# Patient Record
Sex: FEMALE | Race: WHITE | NOT HISPANIC OR LATINO | URBAN - METROPOLITAN AREA
[De-identification: names, ages, dates, MRNs, and addresses within clinical notes are randomized per-mention and may not be internally consistent; named-entity substitution may affect disease eponyms.]

---

## 2023-03-08 LAB — URINE CULTURE: NORMAL

## 2023-03-09 PROBLEM — F41.9 ANXIETY: Status: ACTIVE | Noted: 2023-03-09

## 2023-03-09 PROBLEM — R10.12 ABDOMINAL PAIN, LUQ (LEFT UPPER QUADRANT): Status: ACTIVE | Noted: 2023-03-09

## 2023-03-09 PROBLEM — F41.0 PANIC ATTACK: Status: ACTIVE | Noted: 2023-03-09

## 2023-03-09 PROBLEM — R00.2 PALPITATIONS: Status: ACTIVE | Noted: 2023-03-09

## 2023-03-09 PROBLEM — E05.90 HYPERTHYROIDISM: Status: ACTIVE | Noted: 2023-03-09

## 2023-03-09 PROBLEM — R19.4 CHANGE IN BOWEL HABITS: Status: ACTIVE | Noted: 2023-03-09

## 2023-03-09 PROBLEM — K58.9 IRRITABLE BOWEL DISEASE: Status: ACTIVE | Noted: 2023-03-09

## 2023-03-09 RX ORDER — MULTIVITAMIN
TABLET ORAL
COMMUNITY

## 2023-03-09 RX ORDER — BUSPIRONE HYDROCHLORIDE 7.5 MG/1
1 TABLET ORAL 2 TIMES DAILY
COMMUNITY
Start: 2022-05-05 | End: 2024-01-04 | Stop reason: ALTCHOICE

## 2023-03-09 RX ORDER — CITALOPRAM 20 MG/1
20 TABLET, FILM COATED ORAL DAILY
COMMUNITY
End: 2023-07-06

## 2023-03-10 ENCOUNTER — APPOINTMENT (OUTPATIENT)
Dept: PRIMARY CARE | Facility: CLINIC | Age: 38
End: 2023-03-10

## 2023-07-06 DIAGNOSIS — F41.9 ANXIETY: ICD-10-CM

## 2023-07-06 DIAGNOSIS — F41.9 ANXIETY: Primary | ICD-10-CM

## 2023-07-06 DIAGNOSIS — F41.0 PANIC ATTACKS: ICD-10-CM

## 2023-07-06 RX ORDER — CITALOPRAM 20 MG/1
TABLET, FILM COATED ORAL
Qty: 30 TABLET | Refills: 0 | Status: SHIPPED | OUTPATIENT
Start: 2023-07-06 | End: 2024-01-04 | Stop reason: SDUPTHER

## 2023-07-06 RX ORDER — CITALOPRAM 20 MG/1
20 TABLET, FILM COATED ORAL DAILY
Status: CANCELLED | OUTPATIENT
Start: 2023-07-06

## 2024-01-04 ENCOUNTER — TELEMEDICINE (OUTPATIENT)
Dept: PRIMARY CARE | Facility: CLINIC | Age: 39
End: 2024-01-04
Payer: COMMERCIAL

## 2024-01-04 DIAGNOSIS — F41.0 PANIC ATTACKS: ICD-10-CM

## 2024-01-04 DIAGNOSIS — F41.9 ANXIETY: ICD-10-CM

## 2024-01-04 PROCEDURE — 99214 OFFICE O/P EST MOD 30 MIN: CPT | Performed by: STUDENT IN AN ORGANIZED HEALTH CARE EDUCATION/TRAINING PROGRAM

## 2024-01-04 RX ORDER — HYDROXYZINE HYDROCHLORIDE 10 MG/1
10 TABLET, FILM COATED ORAL 3 TIMES DAILY
Qty: 30 TABLET | Refills: 0 | Status: SHIPPED | OUTPATIENT
Start: 2024-01-04 | End: 2024-02-03

## 2024-01-04 RX ORDER — LOTEPREDNOL ETABONATE 5 MG/ML
SUSPENSION/ DROPS OPHTHALMIC
COMMUNITY
Start: 2023-11-29

## 2024-01-04 RX ORDER — CITALOPRAM 40 MG/1
40 TABLET, FILM COATED ORAL DAILY
Qty: 90 TABLET | Refills: 1 | Status: SHIPPED | OUTPATIENT
Start: 2024-01-04

## 2024-01-04 NOTE — PROGRESS NOTES
Subjective   Patient ID: Minoo Jaffe is a 38 y.o. female who presents for Anxiety.    HPI   Minoo is presenting to the office today via a telemedicine virtual visit to discuss ongoing anxiety and panic attacks    Unfortunately she stated that her house is going through [employer] as well as her  recently moved out    Notices that he has heightened anxiety    She is currently taking Celexa/citalopram 20 mg daily and notices that she is not fully well-controlled    Wondering if she can have some other medication to help for panic attacks even possible a benzodiazepine or something similar    Asking for advice/recommendations  Denies any HI/SI    Review of Systems  All pertinent positive symptoms are included in the history of present illness.    All other systems have been reviewed and are negative and noncontributory to this patient's current ailments.    Objective   There were no vitals taken for this visit.    Physical Exam  CONSTITUTIONAL - well nourished, well developed, looks like stated age, in no acute distress, not ill-appearing, and not tired appearing  SKIN - normal skin color and pigmentation, normal skin turgor without rash, lesions, or nodules visualized  HEAD - no trauma, normocephalic  EYES - normal external exam  CHEST -no distressed breathing, good effort  EXTREMITIES - no edema, no deformities  NEUROLOGICAL - normal balance, normal motor, no ataxia  PSYCHIATRIC - alert, pleasant and cordial, age-appropriate    Assessment/Plan   We had a conversation about all of your signs/symptoms and unfortunately do not prescribe benzodiazepine on the regular as well as I am very limited on what I can do on a virtual visit    In its place I will place you on hydroxyzine 10 mg to be taken up to 10 times daily    You may even take up to 30 mg at a time  Please take this on an as needed basis    Also I did increase her Celexa to 40 mg daily  This was sent to your local pharmacy    Please follow-up  within the next 1-2 months for close follow-up as well as to discuss health maintenance topics/needs

## 2024-01-08 ENCOUNTER — OFFICE VISIT (OUTPATIENT)
Dept: PRIMARY CARE | Facility: CLINIC | Age: 39
End: 2024-01-08
Payer: COMMERCIAL

## 2024-01-08 VITALS
WEIGHT: 124.6 LBS | HEART RATE: 115 BPM | SYSTOLIC BLOOD PRESSURE: 140 MMHG | BODY MASS INDEX: 22.08 KG/M2 | HEIGHT: 63 IN | DIASTOLIC BLOOD PRESSURE: 84 MMHG

## 2024-01-08 DIAGNOSIS — F41.0 PANIC ATTACKS: ICD-10-CM

## 2024-01-08 DIAGNOSIS — Z00.00 HEALTHCARE MAINTENANCE: ICD-10-CM

## 2024-01-08 DIAGNOSIS — F41.9 ANXIETY: ICD-10-CM

## 2024-01-08 DIAGNOSIS — B37.0 ORAL THRUSH: Primary | ICD-10-CM

## 2024-01-08 PROCEDURE — 99214 OFFICE O/P EST MOD 30 MIN: CPT | Performed by: STUDENT IN AN ORGANIZED HEALTH CARE EDUCATION/TRAINING PROGRAM

## 2024-01-08 PROCEDURE — 1036F TOBACCO NON-USER: CPT | Performed by: STUDENT IN AN ORGANIZED HEALTH CARE EDUCATION/TRAINING PROGRAM

## 2024-01-08 RX ORDER — ALPRAZOLAM 0.25 MG/1
0.25 TABLET ORAL 3 TIMES DAILY PRN
Qty: 10 TABLET | Refills: 0 | Status: SHIPPED | OUTPATIENT
Start: 2024-01-08

## 2024-01-08 RX ORDER — CLOTRIMAZOLE 10 MG/1
10 LOZENGE ORAL; TOPICAL
Qty: 50 TABLET | Refills: 0 | Status: SHIPPED | OUTPATIENT
Start: 2024-01-08 | End: 2024-01-18

## 2024-01-08 NOTE — PROGRESS NOTES
Subjective   Patient ID: Minoo Jaffe is a 38 y.o. female who presents for possible thrush.  Today she is accompanied by alone.     HPI  Oral thrush  Began a few days ago  This has not happened to her before  Reports extremely dry mouth   States she notes a white residue and some tongue irritation/hardness on the lateral portions  Wishes to discuss this further    Depression/anxiety   recently left her and house is foreclosure  Recently under overwhelming amount of stress  Gerda with smoking 1 ppd and alcohol  At her last visit it was discussed that we increase her Celexa to 40 mg daily as well as provided her hydroxyzine  Stated that the hydroxyzine just makes her sleepy and does not help  In the past she has used Xanax on an as-needed basis and now wondering if this can be done acutely because she is frustrated that she is not smoking and drinking    Health care maintenance  Due for CBC, CMP, lipid panel, hemoglobin A1c, vitamin B12, vitamin D, and TSH    Current Outpatient Medications on File Prior to Visit   Medication Sig Dispense Refill    citalopram (CeleXA) 40 mg tablet Take 1 tablet (40 mg) by mouth once daily. as directed 90 tablet 1    hydrOXYzine HCL (Atarax) 10 mg tablet Take 1 tablet (10 mg) by mouth 3 times a day. 30 tablet 0    loteprednol (Lotemax) 0.5 % ophthalmic suspension INSTILL 1 DROP IN BOTH EYES TWICE DAILY      multivitamin (Multiple Vitamins) tablet Multiple Vitamin TABS   Refills: 0       Active      [DISCONTINUED] busPIRone (Buspar) 7.5 mg tablet Take 1 tablet (7.5 mg) by mouth in the morning and 1 tablet (7.5 mg) before bedtime.      [DISCONTINUED] citalopram (CeleXA) 20 mg tablet TAKE 1 TABLET BY MOUTH EVERY DAY AS DIRECTED 30 tablet 0     No current facility-administered medications on file prior to visit.        No Known Allergies    Immunization History   Administered Date(s) Administered    Tdap vaccine, age 7 year and older (BOOSTRIX) 03/25/2015         Review of  "Systems  All pertinent positive symptoms are included in the history of present illness.  All other systems have been reviewed and are negative and noncontributory to this patient's current ailments.     Objective   /84 (BP Location: Left arm, Patient Position: Sitting, BP Cuff Size: Adult)   Pulse (!) 115   Ht 1.6 m (5' 3\")   Wt 56.5 kg (124 lb 9.6 oz)   BMI 22.07 kg/m²   BSA: 1.58 meters squared  No visits with results within 1 Month(s) from this visit.   Latest known visit with results is:   Orders Only on 03/08/2023   Component Date Value Ref Range Status    Urine Culture 03/07/2023 NO SIGNIFICANT GROWTH.   Final       Physical Exam  CONSTITUTIONAL - well nourished, well developed, looks like stated age, in no acute distress, not ill-appearing, and not tired appearing  SKIN - normal skin color and pigmentation, normal skin turgor without rash, lesions, or nodules visualized  HEAD - no trauma, normocephalic  EYES - normal external exam  ENT - TM's intact, no injection. White base on tongue, scrapable  NECK - supple without rigidity, no neck mass was observed  CHEST - clear to auscultation, no wheezing, no crackles and no rales, good effort  CARDIAC - regular rate and regular rhythm, no skipped beats, no murmur  EXTREMITIES - no edema, no deformities  NEUROLOGICAL - normal gait, normal balance, normal motor, no ataxia  PSYCHIATRIC - alert, pleasant and cordial, age-appropriate  IMMUNOLOGIC - no cervical lymphadenopathy     Assessment/Plan   Oral thrush  We discussed that current alcohol and smoking combination could be contributing to the thrush  Begin Clotrimazole 10 mg lozenges by mouth 5 times a day for 10 days  If this continues to be an issue need to let us know within the next 7-10 days    Depression/anxiety  Continue taking Celexa 40 mg tablet by mouth once daily  Continue hydroxyzine 10 mg tablet by mouth 3 times daily  Begin Xanax 0.25 mg tablet 3 times a day as needed acutely due to your " signs/symptoms  I recommend that if this continues to be an issue we will have to discuss to change off the Celexa as I do not want Xanax to be continued on a long-term basis  If this continues to be uncontrolled, the neck step would be following with a psychiatrist  I also added fasting lab work as noted below to see if there is anything organic causing this    Health care maintenance  I ordered CBC, CMP, lipid panel, hemoglobin A1c, vitamin B12, vitamin D, and TSH  Please complete at your earliest convenience

## 2024-01-09 ENCOUNTER — LAB (OUTPATIENT)
Dept: LAB | Facility: LAB | Age: 39
End: 2024-01-09
Payer: COMMERCIAL

## 2024-01-09 DIAGNOSIS — Z00.00 HEALTHCARE MAINTENANCE: ICD-10-CM

## 2024-01-09 DIAGNOSIS — F41.9 ANXIETY: ICD-10-CM

## 2024-01-09 DIAGNOSIS — F41.0 PANIC ATTACKS: ICD-10-CM

## 2024-01-09 LAB
25(OH)D3 SERPL-MCNC: 28 NG/ML (ref 30–100)
ALBUMIN SERPL BCP-MCNC: 4.5 G/DL (ref 3.4–5)
ALP SERPL-CCNC: 45 U/L (ref 33–110)
ALT SERPL W P-5'-P-CCNC: 51 U/L (ref 7–45)
ANION GAP SERPL CALC-SCNC: 14 MMOL/L (ref 10–20)
AST SERPL W P-5'-P-CCNC: 41 U/L (ref 9–39)
BASOPHILS # BLD AUTO: 0.03 X10*3/UL (ref 0–0.1)
BASOPHILS NFR BLD AUTO: 0.4 %
BILIRUB SERPL-MCNC: 0.9 MG/DL (ref 0–1.2)
BUN SERPL-MCNC: 9 MG/DL (ref 6–23)
CALCIUM SERPL-MCNC: 9.6 MG/DL (ref 8.6–10.3)
CHLORIDE SERPL-SCNC: 99 MMOL/L (ref 98–107)
CHOLEST SERPL-MCNC: 156 MG/DL (ref 0–199)
CHOLESTEROL/HDL RATIO: 2.1
CO2 SERPL-SCNC: 25 MMOL/L (ref 21–32)
CREAT SERPL-MCNC: 0.57 MG/DL (ref 0.5–1.05)
EGFRCR SERPLBLD CKD-EPI 2021: >90 ML/MIN/1.73M*2
EOSINOPHIL # BLD AUTO: 0.09 X10*3/UL (ref 0–0.7)
EOSINOPHIL NFR BLD AUTO: 1.2 %
ERYTHROCYTE [DISTWIDTH] IN BLOOD BY AUTOMATED COUNT: 12.8 % (ref 11.5–14.5)
EST. AVERAGE GLUCOSE BLD GHB EST-MCNC: 105 MG/DL
GLUCOSE SERPL-MCNC: 105 MG/DL (ref 74–99)
HBA1C MFR BLD: 5.3 %
HCT VFR BLD AUTO: 43.2 % (ref 36–46)
HDLC SERPL-MCNC: 75 MG/DL
HGB BLD-MCNC: 14.5 G/DL (ref 12–16)
IMM GRANULOCYTES # BLD AUTO: 0.02 X10*3/UL (ref 0–0.7)
IMM GRANULOCYTES NFR BLD AUTO: 0.3 % (ref 0–0.9)
LDLC SERPL CALC-MCNC: 63 MG/DL
LYMPHOCYTES # BLD AUTO: 2.21 X10*3/UL (ref 1.2–4.8)
LYMPHOCYTES NFR BLD AUTO: 29.2 %
MCH RBC QN AUTO: 33 PG (ref 26–34)
MCHC RBC AUTO-ENTMCNC: 33.6 G/DL (ref 32–36)
MCV RBC AUTO: 98 FL (ref 80–100)
MONOCYTES # BLD AUTO: 0.78 X10*3/UL (ref 0.1–1)
MONOCYTES NFR BLD AUTO: 10.3 %
NEUTROPHILS # BLD AUTO: 4.43 X10*3/UL (ref 1.2–7.7)
NEUTROPHILS NFR BLD AUTO: 58.6 %
NON HDL CHOLESTEROL: 81 MG/DL (ref 0–149)
NRBC BLD-RTO: 0 /100 WBCS (ref 0–0)
PLATELET # BLD AUTO: 414 X10*3/UL (ref 150–450)
POTASSIUM SERPL-SCNC: 3.7 MMOL/L (ref 3.5–5.3)
PROT SERPL-MCNC: 7.2 G/DL (ref 6.4–8.2)
RBC # BLD AUTO: 4.39 X10*6/UL (ref 4–5.2)
SODIUM SERPL-SCNC: 134 MMOL/L (ref 136–145)
TRIGL SERPL-MCNC: 88 MG/DL (ref 0–149)
TSH SERPL-ACNC: 1.99 MIU/L (ref 0.44–3.98)
VIT B12 SERPL-MCNC: 256 PG/ML (ref 211–911)
VLDL: 18 MG/DL (ref 0–40)
WBC # BLD AUTO: 7.6 X10*3/UL (ref 4.4–11.3)

## 2024-01-09 PROCEDURE — 36415 COLL VENOUS BLD VENIPUNCTURE: CPT

## 2024-01-09 PROCEDURE — 85025 COMPLETE CBC W/AUTO DIFF WBC: CPT

## 2024-01-09 PROCEDURE — 80061 LIPID PANEL: CPT

## 2024-01-09 PROCEDURE — 82306 VITAMIN D 25 HYDROXY: CPT

## 2024-01-09 PROCEDURE — 80053 COMPREHEN METABOLIC PANEL: CPT

## 2024-01-09 PROCEDURE — 84443 ASSAY THYROID STIM HORMONE: CPT

## 2024-01-09 PROCEDURE — 83036 HEMOGLOBIN GLYCOSYLATED A1C: CPT

## 2024-01-09 PROCEDURE — 82607 VITAMIN B-12: CPT

## 2024-01-10 NOTE — RESULT ENCOUNTER NOTE
Vitamin D is low at 28 so I would recommend supplementation    Sugar is slightly high at 105 and sodium 2 points below normal but I am not concerned    Kidneys and electrolytes within normal limits    Liver function slightly elevated at 41 and 51 respectively but ultimately normal is below 39 and 45 respectively so I am not fully concerned    Hemoglobin A1c within normal limits alongside vitamin B12    Thyroid within normal limits    Total cholesterol looks great at 156, HDL 75, LDL 63, triglycerides 88    Complete blood cell count shows no anemia

## 2024-01-11 ENCOUNTER — OFFICE VISIT (OUTPATIENT)
Dept: PRIMARY CARE | Facility: CLINIC | Age: 39
End: 2024-01-11
Payer: COMMERCIAL

## 2024-01-11 VITALS — SYSTOLIC BLOOD PRESSURE: 140 MMHG | DIASTOLIC BLOOD PRESSURE: 86 MMHG | HEART RATE: 97 BPM

## 2024-01-11 DIAGNOSIS — F41.0 PANIC ATTACKS: ICD-10-CM

## 2024-01-11 DIAGNOSIS — F41.9 ANXIETY: Primary | ICD-10-CM

## 2024-01-11 DIAGNOSIS — R68.2 DRY MOUTH AND EYES: ICD-10-CM

## 2024-01-11 DIAGNOSIS — I10 HYPERTENSION, UNSPECIFIED TYPE: ICD-10-CM

## 2024-01-11 DIAGNOSIS — H04.123 DRY MOUTH AND EYES: ICD-10-CM

## 2024-01-11 PROCEDURE — 3079F DIAST BP 80-89 MM HG: CPT | Performed by: STUDENT IN AN ORGANIZED HEALTH CARE EDUCATION/TRAINING PROGRAM

## 2024-01-11 PROCEDURE — 3077F SYST BP >= 140 MM HG: CPT | Performed by: STUDENT IN AN ORGANIZED HEALTH CARE EDUCATION/TRAINING PROGRAM

## 2024-01-11 PROCEDURE — 99214 OFFICE O/P EST MOD 30 MIN: CPT | Performed by: STUDENT IN AN ORGANIZED HEALTH CARE EDUCATION/TRAINING PROGRAM

## 2024-01-11 PROCEDURE — 1036F TOBACCO NON-USER: CPT | Performed by: STUDENT IN AN ORGANIZED HEALTH CARE EDUCATION/TRAINING PROGRAM

## 2024-01-11 RX ORDER — METOPROLOL SUCCINATE 25 MG/1
25 TABLET, EXTENDED RELEASE ORAL DAILY
Qty: 30 TABLET | Refills: 1 | Status: SHIPPED | OUTPATIENT
Start: 2024-01-11 | End: 2024-07-09

## 2024-01-11 ASSESSMENT — ENCOUNTER SYMPTOMS: HYPERTENSION: 1

## 2024-01-11 NOTE — PROGRESS NOTES
Subjective   Patient ID: Minoo Jaffe is a 38 y.o. female who presents for blood pressure concerns.  Today she is accompanied by alone.     Hypertension      High blood pressure/Anxiety  Patient states she is been having episodes of high blood pressure and palpitations.  States she has a 2-week wait to get into see her therapist  Has tried hydroxyzine to no effect   recently left her and house is foreclosure  Recently under overwhelming amount of stress  Gerda with smoking 1 ppd and alcohol  At her last visit it was discussed that we increase her Celexa to 40 mg daily as well as provided her hydroxyzine  Stated that the hydroxyzine and Xanax just makes her sleepy and do not help  Asking for recommendations    Oral thrush/dry mouth  Was recently in the office on 1/8/23 for concerns of oral thrush  Was placed on Clotrimazole 10 mg lozenges by mouth 5 times a day for 10 days  Today patient reports she still has dry mouth and [place] starting prior to starting any of her medications  She asked about labs for Sjogren's syndrome    Current Outpatient Medications on File Prior to Visit   Medication Sig Dispense Refill    ALPRAZolam (Xanax) 0.25 mg tablet Take 1 tablet (0.25 mg) by mouth 3 times a day as needed for anxiety. 10 tablet 0    citalopram (CeleXA) 40 mg tablet Take 1 tablet (40 mg) by mouth once daily. as directed 90 tablet 1    clotrimazole (Mycelex) 10 mg libby Take 1 tablet (10 mg) by mouth 5 times a day for 10 days. 50 tablet 0    hydrOXYzine HCL (Atarax) 10 mg tablet Take 1 tablet (10 mg) by mouth 3 times a day. 30 tablet 0    loteprednol (Lotemax) 0.5 % ophthalmic suspension INSTILL 1 DROP IN BOTH EYES TWICE DAILY      multivitamin (Multiple Vitamins) tablet Multiple Vitamin TABS   Refills: 0       Active       No current facility-administered medications on file prior to visit.        No Known Allergies    Immunization History   Administered Date(s) Administered    Tdap vaccine, age 7 year and  older (BOOSTRIX) 03/25/2015         Review of Systems  All pertinent positive symptoms are included in the history of present illness.  All other systems have been reviewed and are negative and noncontributory to this patient's current ailments.     Objective   /86 (BP Location: Left arm, Patient Position: Sitting, BP Cuff Size: Adult)   Pulse 97   BSA: There is no height or weight on file to calculate BSA.  Lab on 01/09/2024   Component Date Value Ref Range Status    Thyroid Stimulating Hormone 01/09/2024 1.99  0.44 - 3.98 mIU/L Final    Cholesterol 01/09/2024 156  0 - 199 mg/dL Final          Age      Desirable   Borderline High   High     0-19 Y     0 - 169       170 - 199     >/= 200    20-24 Y     0 - 189       190 - 224     >/= 225         >24 Y     0 - 199       200 - 239     >/= 240   **All ranges are based on fasting samples. Specific   therapeutic targets will vary based on patient-specific   cardiac risk.    Pediatric guidelines reference:Pediatrics 2011, 128(S5).Adult guidelines reference: NCEP ATPIII Guidelines,SYEDA 2001, 258:2486-97    Venipuncture immediately after or during the administration of Metamizole may lead to falsely low results. Testing should be performed immediately prior to Metamizole dosing.    HDL-Cholesterol 01/09/2024 75.0  mg/dL Final      Age       Very Low   Low     Normal    High    0-19 Y    < 35      < 40     40-45     ----  20-24 Y    ----     < 40      >45      ----        >24 Y      ----     < 40     40-60      >60      Cholesterol/HDL Ratio 01/09/2024 2.1   Final      Ref Values  Desirable  < 3.4  High Risk  > 5.0    LDL Calculated 01/09/2024 63  <=99 mg/dL Final                                Near   Borderline      AGE      Desirable  Optimal    High     High     Very High     0-19 Y     0 - 109     ---    110-129   >/= 130     ----    20-24 Y     0 - 119     ---    120-159   >/= 160     ----      >24 Y     0 -  99   100-129  130-159   160-189     >/=190      VLDL  01/09/2024 18  0 - 40 mg/dL Final    Triglycerides 01/09/2024 88  0 - 149 mg/dL Final       Age         Desirable   Borderline High   High     Very High   0 D-90 D    19 - 174         ----         ----        ----  91 D- 9 Y     0 -  74        75 -  99     >/= 100      ----    10-19 Y     0 -  89        90 - 129     >/= 130      ----    20-24 Y     0 - 114       115 - 149     >/= 150      ----         >24 Y     0 - 149       150 - 199    200- 499    >/= 500    Venipuncture immediately after or during the administration of Metamizole may lead to falsely low results. Testing should be performed immediately prior to Metamizole dosing.    Non HDL Cholesterol 01/09/2024 81  0 - 149 mg/dL Final          Age       Desirable   Borderline High   High     Very High     0-19 Y     0 - 119       120 - 144     >/= 145    >/= 160    20-24 Y     0 - 149       150 - 189     >/= 190      ----         >24 Y    30 mg/dL above LDL Cholesterol goal      Glucose 01/09/2024 105 (H)  74 - 99 mg/dL Final    Sodium 01/09/2024 134 (L)  136 - 145 mmol/L Final    Potassium 01/09/2024 3.7  3.5 - 5.3 mmol/L Final    Chloride 01/09/2024 99  98 - 107 mmol/L Final    Bicarbonate 01/09/2024 25  21 - 32 mmol/L Final    Anion Gap 01/09/2024 14  10 - 20 mmol/L Final    Urea Nitrogen 01/09/2024 9  6 - 23 mg/dL Final    Creatinine 01/09/2024 0.57  0.50 - 1.05 mg/dL Final    eGFR 01/09/2024 >90  >60 mL/min/1.73m*2 Final    Calculations of estimated GFR are performed using the 2021 CKD-EPI Study Refit equation without the race variable for the IDMS-Traceable creatinine methods.  https://jasn.asnjournals.org/content/early/2021/09/22/ASN.6263203076    Calcium 01/09/2024 9.6  8.6 - 10.3 mg/dL Final    Albumin 01/09/2024 4.5  3.4 - 5.0 g/dL Final    Alkaline Phosphatase 01/09/2024 45  33 - 110 U/L Final    Total Protein 01/09/2024 7.2  6.4 - 8.2 g/dL Final    AST 01/09/2024 41 (H)  9 - 39 U/L Final    Bilirubin, Total 01/09/2024 0.9  0.0 - 1.2 mg/dL Final     ALT 01/09/2024 51 (H)  7 - 45 U/L Final    Patients treated with Sulfasalazine may generate falsely decreased results for ALT.    WBC 01/09/2024 7.6  4.4 - 11.3 x10*3/uL Final    nRBC 01/09/2024 0.0  0.0 - 0.0 /100 WBCs Final    RBC 01/09/2024 4.39  4.00 - 5.20 x10*6/uL Final    Hemoglobin 01/09/2024 14.5  12.0 - 16.0 g/dL Final    Hematocrit 01/09/2024 43.2  36.0 - 46.0 % Final    MCV 01/09/2024 98  80 - 100 fL Final    MCH 01/09/2024 33.0  26.0 - 34.0 pg Final    MCHC 01/09/2024 33.6  32.0 - 36.0 g/dL Final    RDW 01/09/2024 12.8  11.5 - 14.5 % Final    Platelets 01/09/2024 414  150 - 450 x10*3/uL Final    Neutrophils % 01/09/2024 58.6  40.0 - 80.0 % Final    Immature Granulocytes %, Automated 01/09/2024 0.3  0.0 - 0.9 % Final    Immature Granulocyte Count (IG) includes promyelocytes, myelocytes and metamyelocytes but does not include bands. Percent differential counts (%) should be interpreted in the context of the absolute cell counts (cells/UL).    Lymphocytes % 01/09/2024 29.2  13.0 - 44.0 % Final    Monocytes % 01/09/2024 10.3  2.0 - 10.0 % Final    Eosinophils % 01/09/2024 1.2  0.0 - 6.0 % Final    Basophils % 01/09/2024 0.4  0.0 - 2.0 % Final    Neutrophils Absolute 01/09/2024 4.43  1.20 - 7.70 x10*3/uL Final    Percent differential counts (%) should be interpreted in the context of the absolute cell counts (cells/uL).    Immature Granulocytes Absolute, Au* 01/09/2024 0.02  0.00 - 0.70 x10*3/uL Final    Lymphocytes Absolute 01/09/2024 2.21  1.20 - 4.80 x10*3/uL Final    Monocytes Absolute 01/09/2024 0.78  0.10 - 1.00 x10*3/uL Final    Eosinophils Absolute 01/09/2024 0.09  0.00 - 0.70 x10*3/uL Final    Basophils Absolute 01/09/2024 0.03  0.00 - 0.10 x10*3/uL Final    Hemoglobin A1C 01/09/2024 5.3  see below % Final    Estimated Average Glucose 01/09/2024 105  Not Established mg/dL Final    Vitamin B12 01/09/2024 256  211 - 911 pg/mL Final    Vitamin D, 25-Hydroxy, Total 01/09/2024 28 (L)  30 - 100 ng/mL  Final       Physical Exam  CONSTITUTIONAL - well nourished, well developed, looks like stated age, in no acute distress, not ill-appearing, and not tired appearing  SKIN - normal skin color and pigmentation, normal skin turgor without rash, lesions, or nodules visualized  HEAD - no trauma, normocephalic  EYES - normal external exam  CHEST -no distressed breathing, good effort  EXTREMITIES - no edema, no deformities  NEUROLOGICAL - normal balance, normal motor, no ataxia  PSYCHIATRIC - alert, pleasant and cordial, age-appropriate      Assessment/Plan   High blood pressure/Anxiety  Started here your anxiety is still not well-controlled  Please continue to follow up with your therapist  We also talked about a CBT workbook for help controlling thoughts  We increased your citalopram to 40 mg at your previous visit.  It can take up to 4 weeks for full results of this medication change.  In addition we sent a prescription of metoprolol to your pharmacy to help with palpitations and anxiety   Please follow-up if your symptoms get worse or do not resolve    Oral thrush/dry mucous membranes  We ordered OSCAR plus CARLOS labs to rule out Sjogren's syndrome  Please get blood work at your earliest convenience  We will reach out with recommendations as results become available.

## 2025-02-12 DIAGNOSIS — F41.9 ANXIETY: ICD-10-CM

## 2025-02-12 DIAGNOSIS — F41.0 PANIC ATTACKS: ICD-10-CM

## 2025-02-12 RX ORDER — CITALOPRAM 40 MG/1
40 TABLET, FILM COATED ORAL DAILY
Qty: 30 TABLET | Refills: 0 | Status: SHIPPED | OUTPATIENT
Start: 2025-02-12

## 2025-06-17 ENCOUNTER — TELEMEDICINE (OUTPATIENT)
Dept: PRIMARY CARE | Facility: CLINIC | Age: 40
End: 2025-06-17
Payer: COMMERCIAL

## 2025-06-17 DIAGNOSIS — F41.0 PANIC ATTACKS: ICD-10-CM

## 2025-06-17 DIAGNOSIS — F41.9 ANXIETY: ICD-10-CM

## 2025-06-17 PROCEDURE — 1036F TOBACCO NON-USER: CPT | Performed by: STUDENT IN AN ORGANIZED HEALTH CARE EDUCATION/TRAINING PROGRAM

## 2025-06-17 PROCEDURE — 99213 OFFICE O/P EST LOW 20 MIN: CPT | Performed by: STUDENT IN AN ORGANIZED HEALTH CARE EDUCATION/TRAINING PROGRAM

## 2025-06-17 RX ORDER — CITALOPRAM 40 MG/1
40 TABLET ORAL DAILY
Qty: 90 TABLET | Refills: 1 | Status: SHIPPED | OUTPATIENT
Start: 2025-06-17

## 2025-06-17 RX ORDER — HYDROXYZINE HYDROCHLORIDE 10 MG/1
10 TABLET, FILM COATED ORAL 3 TIMES DAILY
Qty: 30 TABLET | Refills: 0 | Status: CANCELLED | OUTPATIENT
Start: 2025-06-17 | End: 2025-07-17

## 2025-06-17 NOTE — PROGRESS NOTES
Chief Complaint  Patient ID: Minoo Jaffe is a 40 y.o. female is presenting for a virtual visit.  We utilized an interactive audio and video telecommunication system which permits real time communications between this patient (at the originating site) and provider (at this site) to provide a telehealth service for Anxiety.    Verbal consent was requested and obtained from Minoo Jaffe on this date of service for said telehealth visit.          Reviewed all medications by prescribing practitioner or clinical pharmacist (such as prescriptions, OTCs, herbal therapies and supplements) and documented in the medical record.    History of Present Illness    1.  High blood pressure/anxiety  The most recent time patient was in the office was in January 2024  Continues to have signs/symptoms of increased anxiety including blood pressures  Ultimately we did increase her Celexa to 40 mg as well as provided hydroxyzine  Stated in the past hydroxyzine and Xanax just make her sleepy and did not help fully  Ultimately, she is calling into the office today via telemedicine virtual visit to follow-up for this and discuss refills    It was also noted in the past that we even sent a prescription of metoprolol to help with palpitations and anxiety    At this point is only taking Celexa feels well and denies any highs/lows and requesting refills        Review of Systems  All pertinent positive symptoms are included in the history of present illness.    All other systems have been reviewed and are negative and noncontributory to this patient's current ailments.    Past Medical History  She has no past medical history on file.    Surgical History  She has a past surgical history that includes Other surgical history (06/19/2020).     Social History  She reports that she has never smoked. She has never used smokeless tobacco. No history on file for alcohol use and drug use.    Family History[1]  Medications Prior to  Visit[2]  Allergies  Patient has no known allergies.    Immunization History   Administered Date(s) Administered    Tdap vaccine, age 7 year and older (BOOSTRIX, ADACEL) 03/25/2015     Objective   Visit Vitals  Smoking Status Never     No visits with results within 1 Month(s) from this visit.   Latest known visit with results is:   Lab on 01/09/2024   Component Date Value    Thyroid Stimulating Horm* 01/09/2024 1.99     Cholesterol 01/09/2024 156     HDL-Cholesterol 01/09/2024 75.0     Cholesterol/HDL Ratio 01/09/2024 2.1     LDL Calculated 01/09/2024 63     VLDL 01/09/2024 18     Triglycerides 01/09/2024 88     Non HDL Cholesterol 01/09/2024 81     Glucose 01/09/2024 105 (H)     Sodium 01/09/2024 134 (L)     Potassium 01/09/2024 3.7     Chloride 01/09/2024 99     Bicarbonate 01/09/2024 25     Anion Gap 01/09/2024 14     Urea Nitrogen 01/09/2024 9     Creatinine 01/09/2024 0.57     eGFR 01/09/2024 >90     Calcium 01/09/2024 9.6     Albumin 01/09/2024 4.5     Alkaline Phosphatase 01/09/2024 45     Total Protein 01/09/2024 7.2     AST 01/09/2024 41 (H)     Bilirubin, Total 01/09/2024 0.9     ALT 01/09/2024 51 (H)     WBC 01/09/2024 7.6     nRBC 01/09/2024 0.0     RBC 01/09/2024 4.39     Hemoglobin 01/09/2024 14.5     Hematocrit 01/09/2024 43.2     MCV 01/09/2024 98     MCH 01/09/2024 33.0     MCHC 01/09/2024 33.6     RDW 01/09/2024 12.8     Platelets 01/09/2024 414     Neutrophils % 01/09/2024 58.6     Immature Granulocytes %,* 01/09/2024 0.3     Lymphocytes % 01/09/2024 29.2     Monocytes % 01/09/2024 10.3     Eosinophils % 01/09/2024 1.2     Basophils % 01/09/2024 0.4     Neutrophils Absolute 01/09/2024 4.43     Immature Granulocytes Ab* 01/09/2024 0.02     Lymphocytes Absolute 01/09/2024 2.21     Monocytes Absolute 01/09/2024 0.78     Eosinophils Absolute 01/09/2024 0.09     Basophils Absolute 01/09/2024 0.03     Hemoglobin A1C 01/09/2024 5.3     Estimated Average Glucose 01/09/2024 105     Vitamin B12 01/09/2024  256     Vitamin D, 25-Hydroxy, T* 01/09/2024 28 (L)      Physical Exam  CONSTITUTIONAL - well nourished, well developed, looks like stated age, in no acute distress, not ill-appearing, and not tired appearing  SKIN - normal skin color and pigmentation  EYES - normal external exam  LUNGS - breathing comfortably, no dyspnea  EXTREMITIES - no deformities noticeable on camera  NEUROLOGICAL - oriented and no focal signs  PSYCHIATRIC - alert, pleasant and cordial, not anxious or depressed appearing    Assessment and Plan  Problem List Items Addressed This Visit       Anxiety    Relevant Medications    citalopram (CeleXA) 40 mg tablet     Other Visit Diagnoses         Panic attacks        Relevant Medications    citalopram (CeleXA) 40 mg tablet        Stable without any changes recommended  We will continue Celexa/citalopram at 40 mg daily    Refill sent to your local pharmacy       [1] No family history on file.  [2]   Outpatient Medications Prior to Visit   Medication Sig Dispense Refill    loteprednol (Lotemax) 0.5 % ophthalmic suspension INSTILL 1 DROP IN BOTH EYES TWICE DAILY      multivitamin (Multiple Vitamins) tablet Multiple Vitamin TABS   Refills: 0       Active      ALPRAZolam (Xanax) 0.25 mg tablet Take 1 tablet (0.25 mg) by mouth 3 times a day as needed for anxiety. 10 tablet 0    citalopram (CeleXA) 40 mg tablet Take 1 tablet (40 mg) by mouth once daily. as directed 30 tablet 0    hydrOXYzine HCL (Atarax) 10 mg tablet Take 1 tablet (10 mg) by mouth 3 times a day. 30 tablet 0    metoprolol succinate XL (Toprol-XL) 25 mg 24 hr tablet Take 1 tablet (25 mg) by mouth once daily. Do not crush or chew. 30 tablet 1     No facility-administered medications prior to visit.